# Patient Record
Sex: MALE | Race: WHITE
[De-identification: names, ages, dates, MRNs, and addresses within clinical notes are randomized per-mention and may not be internally consistent; named-entity substitution may affect disease eponyms.]

---

## 2019-06-22 ENCOUNTER — HOSPITAL ENCOUNTER (EMERGENCY)
Dept: HOSPITAL 92 - ERS | Age: 59
Discharge: HOME | End: 2019-06-22
Payer: COMMERCIAL

## 2019-06-22 DIAGNOSIS — Z79.899: ICD-10-CM

## 2019-06-22 DIAGNOSIS — S92.061A: Primary | ICD-10-CM

## 2019-06-22 DIAGNOSIS — I10: ICD-10-CM
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
## 2019-06-22 DIAGNOSIS — F41.9: ICD-10-CM

## 2019-06-22 DIAGNOSIS — F17.220: ICD-10-CM

## 2019-06-22 PROCEDURE — 28400 CLTX CALCANEAL FX W/O MNPJ: CPT

## 2019-06-22 NOTE — RAD
EXAM:

XR Ankle Rt 3 View STANDARD



PROVIDED CLINICAL HISTORY:

Injury



COMPARISON:

None



FINDINGS:

The lateral view is suboptimally positioned, limiting evaluation. There is irregular lucency involvin
g the posterior aspect of the calcaneus suspicious for calcaneal fracture. Bimalleolar soft tissue

swelling without additional fracture evident.



IMPRESSION:

Limited study due to positioning on the lateral view. Comminuted intra-articular calcaneal fracture. 
Consider CT.



Reported By: Inderjit Guillen 

Electronically Signed:  6/22/2019 3:45 PM